# Patient Record
Sex: MALE | Race: BLACK OR AFRICAN AMERICAN
[De-identification: names, ages, dates, MRNs, and addresses within clinical notes are randomized per-mention and may not be internally consistent; named-entity substitution may affect disease eponyms.]

---

## 2018-07-16 ENCOUNTER — HOSPITAL ENCOUNTER (EMERGENCY)
Dept: HOSPITAL 91 - FTE | Age: 48
Discharge: HOME | End: 2018-07-16
Payer: COMMERCIAL

## 2018-07-16 ENCOUNTER — HOSPITAL ENCOUNTER (EMERGENCY)
Age: 48
Discharge: HOME | End: 2018-07-16

## 2018-07-16 DIAGNOSIS — R19.7: ICD-10-CM

## 2018-07-16 DIAGNOSIS — K80.20: Primary | ICD-10-CM

## 2018-07-16 DIAGNOSIS — J45.909: ICD-10-CM

## 2018-07-16 DIAGNOSIS — Z91.010: ICD-10-CM

## 2018-07-16 DIAGNOSIS — D64.9: ICD-10-CM

## 2018-07-16 DIAGNOSIS — D72.819: ICD-10-CM

## 2018-07-16 DIAGNOSIS — F17.210: ICD-10-CM

## 2018-07-16 LAB
ADD MAN DIFF?: NO
ALANINE AMINOTRANSFERASE: 44 IU/L (ref 13–69)
ALBUMIN/GLOBULIN RATIO: 0.54
ALBUMIN: 3.6 G/DL (ref 3.3–4.9)
ALKALINE PHOSPHATASE: 73 IU/L (ref 42–121)
ANION GAP: 12 (ref 8–16)
ASPARTATE AMINO TRANSFERASE: 51 IU/L (ref 15–46)
BASOPHIL #: 0 10^3/UL (ref 0–0.1)
BASOPHILS %: 0.2 % (ref 0–2)
BILIRUBIN,DIRECT: 0 MG/DL (ref 0–0.2)
BILIRUBIN,TOTAL: 0.4 MG/DL (ref 0.2–1.3)
BLOOD UREA NITROGEN: 15 MG/DL (ref 7–20)
CALCIUM: 9.1 MG/DL (ref 8.4–10.2)
CARBON DIOXIDE: 23 MMOL/L (ref 21–31)
CHLORIDE: 108 MMOL/L (ref 97–110)
CREATININE: 1.27 MG/DL (ref 0.61–1.24)
EOSINOPHILS #: 0 10^3/UL (ref 0–0.5)
EOSINOPHILS %: 0.8 % (ref 0–7)
GLOBULIN: 6.6 G/DL (ref 1.3–3.2)
GLUCOSE: 87 MG/DL (ref 70–220)
HEMATOCRIT: 36.3 % (ref 42–52)
HEMOGLOBIN: 12.1 G/DL (ref 14–18)
LIPASE: 96 U/L (ref 23–300)
LYMPHOCYTES #: 0.9 10^3/UL (ref 0.8–2.9)
LYMPHOCYTES %: 18.1 % (ref 15–51)
MEAN CORPUSCULAR HEMOGLOBIN: 30.3 PG (ref 29–33)
MEAN CORPUSCULAR HGB CONC: 33.3 G/DL (ref 32–37)
MEAN CORPUSCULAR VOLUME: 91 FL (ref 82–101)
MEAN PLATELET VOLUME: 9.2 FL (ref 7.4–10.4)
MONOCYTE #: 0.8 10^3/UL (ref 0.3–0.9)
MONOCYTES %: 16.9 % (ref 0–11)
NEUTROPHIL #: 3 10^3/UL (ref 1.6–7.5)
NEUTROPHILS %: 63.6 % (ref 39–77)
NUCLEATED RED BLOOD CELLS #: 0 10^3/UL (ref 0–0)
NUCLEATED RED BLOOD CELLS%: 0 /100WBC (ref 0–0)
PLATELET COUNT: 298 10^3/UL (ref 140–415)
POTASSIUM: 3.5 MMOL/L (ref 3.5–5.1)
RED BLOOD COUNT: 3.99 10^6/UL (ref 4.7–6.1)
RED CELL DISTRIBUTION WIDTH: 12.7 % (ref 11.5–14.5)
SODIUM: 139 MMOL/L (ref 135–144)
TOTAL PROTEIN: 10.2 G/DL (ref 6.1–8.1)
WHITE BLOOD COUNT: 4.7 10^3/UL (ref 4.8–10.8)

## 2018-07-16 PROCEDURE — 85025 COMPLETE CBC W/AUTO DIFF WBC: CPT

## 2018-07-16 PROCEDURE — 80053 COMPREHEN METABOLIC PANEL: CPT

## 2018-07-16 PROCEDURE — 74176 CT ABD & PELVIS W/O CONTRAST: CPT

## 2018-07-16 PROCEDURE — 99284 EMERGENCY DEPT VISIT MOD MDM: CPT

## 2018-07-16 PROCEDURE — 36415 COLL VENOUS BLD VENIPUNCTURE: CPT

## 2018-07-16 PROCEDURE — 83690 ASSAY OF LIPASE: CPT

## 2018-07-17 ENCOUNTER — HOSPITAL ENCOUNTER (EMERGENCY)
Dept: HOSPITAL 54 - ER | Age: 48
Discharge: HOME | End: 2018-07-17
Payer: COMMERCIAL

## 2018-07-17 VITALS
SYSTOLIC BLOOD PRESSURE: 136 MMHG | BODY MASS INDEX: 22.35 KG/M2 | DIASTOLIC BLOOD PRESSURE: 94 MMHG | HEIGHT: 72 IN | WEIGHT: 165 LBS

## 2018-07-17 DIAGNOSIS — F17.200: ICD-10-CM

## 2018-07-17 DIAGNOSIS — R10.9: ICD-10-CM

## 2018-07-17 DIAGNOSIS — F19.10: Primary | ICD-10-CM

## 2018-07-17 DIAGNOSIS — E11.9: ICD-10-CM

## 2018-07-17 DIAGNOSIS — E87.6: ICD-10-CM

## 2018-07-17 DIAGNOSIS — J45.909: ICD-10-CM

## 2018-07-17 LAB
ALBUMIN SERPL BCP-MCNC: 2.5 G/DL (ref 3.4–5)
ALP SERPL-CCNC: 55 U/L (ref 46–116)
ALT SERPL W P-5'-P-CCNC: 38 U/L (ref 12–78)
AST SERPL W P-5'-P-CCNC: 34 U/L (ref 15–37)
BASOPHILS # BLD AUTO: 0 /CMM (ref 0–0.2)
BASOPHILS NFR BLD AUTO: 0.4 % (ref 0–2)
BILIRUB DIRECT SERPL-MCNC: 0.1 MG/DL (ref 0–0.2)
BILIRUB SERPL-MCNC: 0.4 MG/DL (ref 0.2–1)
BILIRUB UR QL STRIP: (no result)
BUN SERPL-MCNC: 9 MG/DL (ref 7–18)
CALCIUM SERPL-MCNC: 8.5 MG/DL (ref 8.5–10.1)
CHLORIDE SERPL-SCNC: 102 MMOL/L (ref 98–107)
CO2 SERPL-SCNC: 23 MMOL/L (ref 21–32)
CREAT SERPL-MCNC: 1.1 MG/DL (ref 0.6–1.3)
EOSINOPHIL NFR BLD AUTO: 0.9 % (ref 0–6)
GLUCOSE SERPL-MCNC: 89 MG/DL (ref 74–106)
HCT VFR BLD AUTO: 39 % (ref 39–51)
HGB BLD-MCNC: 13 G/DL (ref 13.5–17.5)
KETONES UR STRIP-MCNC: 15 MG/DL
LIPASE SERPL-CCNC: 114 U/L (ref 73–393)
LYMPHOCYTES NFR BLD AUTO: 0.9 /CMM (ref 0.8–4.8)
LYMPHOCYTES NFR BLD AUTO: 18.1 % (ref 20–44)
MCH RBC QN AUTO: 30 PG (ref 26–33)
MCHC RBC AUTO-ENTMCNC: 34 G/DL (ref 31–36)
MCV RBC AUTO: 90 FL (ref 80–96)
MONOCYTES NFR BLD AUTO: 0.5 /CMM (ref 0.1–1.3)
MONOCYTES NFR BLD AUTO: 10.5 % (ref 2–12)
NEUTROPHILS # BLD AUTO: 3.4 /CMM (ref 1.8–8.9)
NEUTROPHILS NFR BLD AUTO: 70.1 % (ref 43–81)
PH UR STRIP: 7 [PH] (ref 5–8)
PLATELET # BLD AUTO: 298 /CMM (ref 150–450)
POTASSIUM SERPL-SCNC: 3.1 MMOL/L (ref 3.5–5.1)
PROT SERPL-MCNC: 11.2 G/DL (ref 6.4–8.2)
PROT UR QL STRIP: 100 MG/DL
RBC # BLD AUTO: 4.31 MIL/UL (ref 4.5–6)
RBC #/AREA URNS HPF: (no result) /HPF (ref 0–2)
RDW COEFFICIENT OF VARIATION: 12.7 (ref 11.5–15)
SODIUM SERPL-SCNC: 133 MMOL/L (ref 136–145)
TROPONIN I SERPL-MCNC: < 0.017 NG/ML (ref 0–0.06)
UROBILINOGEN UR STRIP-MCNC: 2 EU/DL
WBC #/AREA URNS HPF: (no result) /HPF (ref 0–3)
WBC NRBC COR # BLD AUTO: 4.8 K/UL (ref 4.3–11)

## 2018-07-17 PROCEDURE — A4606 OXYGEN PROBE USED W OXIMETER: HCPCS

## 2018-07-17 PROCEDURE — Z7610: HCPCS

## 2018-07-17 NOTE — NUR
-------------------------------------------------------------------------------

            *** Note jonathan in ED - 07/17/18 at 1917 by ADA ***            

-------------------------------------------------------------------------------

the patient came to ER today for treatment and evaluation of abdominal pain 
with nausea and vomiting

## 2018-07-17 NOTE — NUR
-------------------------------------------------------------------------------

            *** Note undone in [facility] - 07/17/18 at 1917 by ADA ***            

-------------------------------------------------------------------------------

awaiting fo rfMD to see

## 2018-07-17 NOTE — NUR
-------------------------------------------------------------------------------

            *** Note jonathan in ED - 07/17/18 at 1917 by ADA ***            

-------------------------------------------------------------------------------

the patient had colostomy - LLQ; appears viable; done July 2, 2018.  The 
patient's RLQ colostomy -closed and new one LLQ  created with bowel resection.  


The patient has history ulcerative colitis

## 2018-08-08 ENCOUNTER — HOSPITAL ENCOUNTER (EMERGENCY)
Age: 48
LOS: 1 days | Discharge: HOME | End: 2018-08-09

## 2018-08-08 ENCOUNTER — HOSPITAL ENCOUNTER (EMERGENCY)
Dept: HOSPITAL 91 - E/R | Age: 48
LOS: 1 days | Discharge: HOME | End: 2018-08-09
Payer: COMMERCIAL

## 2018-08-08 DIAGNOSIS — S13.9XXA: ICD-10-CM

## 2018-08-08 DIAGNOSIS — S00.01XA: ICD-10-CM

## 2018-08-08 DIAGNOSIS — J45.909: ICD-10-CM

## 2018-08-08 DIAGNOSIS — S09.90XA: ICD-10-CM

## 2018-08-08 DIAGNOSIS — S80.02XA: ICD-10-CM

## 2018-08-08 DIAGNOSIS — S60.211A: ICD-10-CM
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
## 2018-08-08 DIAGNOSIS — F17.210: ICD-10-CM

## 2018-08-08 DIAGNOSIS — Z91.010: ICD-10-CM

## 2018-08-08 DIAGNOSIS — S50.02XA: Primary | ICD-10-CM

## 2018-08-08 PROCEDURE — 72170 X-RAY EXAM OF PELVIS: CPT

## 2018-08-08 PROCEDURE — 71045 X-RAY EXAM CHEST 1 VIEW: CPT

## 2018-08-08 PROCEDURE — 72125 CT NECK SPINE W/O DYE: CPT

## 2018-08-08 PROCEDURE — 73080 X-RAY EXAM OF ELBOW: CPT

## 2018-08-08 PROCEDURE — 73110 X-RAY EXAM OF WRIST: CPT

## 2018-08-08 PROCEDURE — 99285 EMERGENCY DEPT VISIT HI MDM: CPT

## 2018-08-08 PROCEDURE — 73562 X-RAY EXAM OF KNEE 3: CPT

## 2018-08-08 PROCEDURE — 70450 CT HEAD/BRAIN W/O DYE: CPT

## 2018-08-08 RX ADMIN — ONDANSETRON 1 MG: 4 TABLET, ORALLY DISINTEGRATING ORAL at 22:23

## 2018-08-08 RX ADMIN — HYDROCODONE BITARTRATE AND ACETAMINOPHEN 1 TAB: 5; 325 TABLET ORAL at 22:23

## 2018-09-28 ENCOUNTER — HOSPITAL ENCOUNTER (EMERGENCY)
Age: 48
Discharge: HOME | End: 2018-09-28

## 2018-09-28 ENCOUNTER — HOSPITAL ENCOUNTER (EMERGENCY)
Dept: HOSPITAL 91 - E/R | Age: 48
Discharge: HOME | End: 2018-09-28
Payer: COMMERCIAL

## 2018-09-28 DIAGNOSIS — F15.10: Primary | ICD-10-CM

## 2018-09-28 DIAGNOSIS — J45.909: ICD-10-CM

## 2018-09-28 DIAGNOSIS — Z87.891: ICD-10-CM

## 2018-09-28 PROCEDURE — 99282 EMERGENCY DEPT VISIT SF MDM: CPT

## 2018-09-29 ENCOUNTER — HOSPITAL ENCOUNTER (EMERGENCY)
Dept: HOSPITAL 91 - E/R | Age: 48
Discharge: HOME | End: 2018-09-29
Payer: COMMERCIAL

## 2018-09-29 ENCOUNTER — HOSPITAL ENCOUNTER (EMERGENCY)
Age: 48
Discharge: HOME | End: 2018-09-29

## 2018-09-29 DIAGNOSIS — Z87.891: ICD-10-CM

## 2018-09-29 DIAGNOSIS — Z91.010: ICD-10-CM

## 2018-09-29 DIAGNOSIS — F99: Primary | ICD-10-CM

## 2018-09-29 DIAGNOSIS — J45.909: ICD-10-CM

## 2018-09-29 PROCEDURE — 99282 EMERGENCY DEPT VISIT SF MDM: CPT

## 2018-10-31 ENCOUNTER — HOSPITAL ENCOUNTER (EMERGENCY)
Dept: HOSPITAL 87 - ER | Age: 48
Discharge: HOME | End: 2018-10-31
Payer: COMMERCIAL

## 2018-10-31 VITALS — DIASTOLIC BLOOD PRESSURE: 75 MMHG | SYSTOLIC BLOOD PRESSURE: 115 MMHG

## 2018-10-31 VITALS — HEIGHT: 73 IN | WEIGHT: 169.76 LBS | BODY MASS INDEX: 22.5 KG/M2

## 2018-10-31 DIAGNOSIS — G62.9: ICD-10-CM

## 2018-10-31 DIAGNOSIS — J45.909: ICD-10-CM

## 2018-10-31 DIAGNOSIS — R07.89: Primary | ICD-10-CM

## 2018-10-31 DIAGNOSIS — Z98.890: ICD-10-CM

## 2018-10-31 DIAGNOSIS — R06.00: ICD-10-CM

## 2018-10-31 DIAGNOSIS — F17.200: ICD-10-CM

## 2018-10-31 DIAGNOSIS — I10: ICD-10-CM

## 2018-10-31 LAB
BASOPHILS NFR BLD AUTO: 0.3 % (ref 0–2)
CHLORIDE SERPL-SCNC: 109 MEQ/L (ref 98–107)
EOSINOPHIL NFR BLD AUTO: 2.4 % (ref 0–5)
ERYTHROCYTE [DISTWIDTH] IN BLOOD BY AUTOMATED COUNT: 14.7 % (ref 11.6–14.6)
HCT VFR BLD AUTO: 32 % (ref 42–52)
HGB BLD-MCNC: 11 G/DL (ref 14–18)
LYMPHOCYTES NFR BLD AUTO: 20 % (ref 20–50)
MCH RBC QN AUTO: 31 PG (ref 28–32)
MCV RBC AUTO: 90.7 FL (ref 80–94)
MONOCYTES NFR BLD AUTO: 13.5 % (ref 2–8)
NEUTROPHILS NFR BLD AUTO: 63.8 % (ref 40–76)
PLATELET # BLD AUTO: 275 X1000/UL (ref 130–400)
PMV BLD AUTO: 7.6 FL (ref 7.4–10.4)
RBC # BLD AUTO: 3.53 MILL/UL (ref 4.7–6.1)

## 2018-10-31 PROCEDURE — 84484 ASSAY OF TROPONIN QUANT: CPT

## 2018-10-31 PROCEDURE — 85025 COMPLETE CBC W/AUTO DIFF WBC: CPT

## 2018-10-31 PROCEDURE — 83690 ASSAY OF LIPASE: CPT

## 2018-10-31 PROCEDURE — 93005 ELECTROCARDIOGRAM TRACING: CPT

## 2018-10-31 PROCEDURE — 99285 EMERGENCY DEPT VISIT HI MDM: CPT

## 2018-10-31 PROCEDURE — 83880 ASSAY OF NATRIURETIC PEPTIDE: CPT

## 2018-10-31 PROCEDURE — 36415 COLL VENOUS BLD VENIPUNCTURE: CPT

## 2018-10-31 PROCEDURE — 80053 COMPREHEN METABOLIC PANEL: CPT

## 2018-10-31 PROCEDURE — 71045 X-RAY EXAM CHEST 1 VIEW: CPT

## 2018-11-02 ENCOUNTER — HOSPITAL ENCOUNTER (EMERGENCY)
Dept: HOSPITAL 91 - FTE | Age: 48
Discharge: HOME | End: 2018-11-02
Payer: COMMERCIAL

## 2018-11-02 ENCOUNTER — HOSPITAL ENCOUNTER (EMERGENCY)
Age: 48
Discharge: HOME | End: 2018-11-02

## 2018-11-02 DIAGNOSIS — J45.909: ICD-10-CM

## 2018-11-02 DIAGNOSIS — F17.210: ICD-10-CM

## 2018-11-02 DIAGNOSIS — R21: Primary | ICD-10-CM

## 2018-11-02 DIAGNOSIS — Z21: ICD-10-CM

## 2018-11-02 PROCEDURE — 99282 EMERGENCY DEPT VISIT SF MDM: CPT

## 2021-12-27 ENCOUNTER — HOSPITAL ENCOUNTER (EMERGENCY)
Dept: HOSPITAL 87 - ER | Age: 51
Discharge: LEFT BEFORE DISCHARGE | End: 2021-12-27
Payer: MEDICAID

## 2021-12-27 VITALS — WEIGHT: 187.39 LBS | BODY MASS INDEX: 24.84 KG/M2 | HEIGHT: 73 IN

## 2021-12-27 VITALS — SYSTOLIC BLOOD PRESSURE: 171 MMHG | DIASTOLIC BLOOD PRESSURE: 110 MMHG

## 2021-12-27 DIAGNOSIS — R07.89: Primary | ICD-10-CM

## 2021-12-27 LAB
AMPHETAMINES UR QL SCN: (no result)
APPEARANCE UR: CLEAR
BARBITURATES UR QL SCN: NEGATIVE
BENZODIAZ UR QL SCN: NEGATIVE
BZE UR QL SCN: NEGATIVE
CANNABINOIDS UR QL SCN: (no result)
COLOR UR: YELLOW
HGB UR QL STRIP: NEGATIVE
KETONES UR STRIP-MCNC: (no result) MG/DL
LEUKOCYTE ESTERASE UR QL STRIP: NEGATIVE
METHADONE UR QL SCN: NEGATIVE
NITRITE UR QL STRIP: NEGATIVE
OPIATES UR QL SCN: NEGATIVE
PCP UR QL SCN: NEGATIVE
PH UR STRIP: 5.5 [PH] (ref 4.5–8)
PROT UR QL STRIP: (no result)
SP GR UR STRIP: 1.03 (ref 1–1.03)
UROBILINOGEN UR STRIP-MCNC: 1 E.U./DL (ref 0.2–1)

## 2021-12-27 PROCEDURE — 81003 URINALYSIS AUTO W/O SCOPE: CPT

## 2021-12-27 PROCEDURE — 80305 DRUG TEST PRSMV DIR OPT OBS: CPT

## 2022-09-03 ENCOUNTER — HOSPITAL ENCOUNTER (EMERGENCY)
Dept: HOSPITAL 87 - ER | Age: 52
LOS: 1 days | Discharge: LEFT BEFORE DISCHARGE | End: 2022-09-04
Payer: MEDICAID

## 2022-09-03 VITALS — DIASTOLIC BLOOD PRESSURE: 100 MMHG | SYSTOLIC BLOOD PRESSURE: 147 MMHG

## 2022-09-03 VITALS — HEIGHT: 70 IN | WEIGHT: 160.94 LBS | BODY MASS INDEX: 23.04 KG/M2

## 2022-09-03 DIAGNOSIS — Z53.21: Primary | ICD-10-CM

## 2022-11-30 ENCOUNTER — HOSPITAL ENCOUNTER (EMERGENCY)
Dept: HOSPITAL 87 - ER | Age: 52
Discharge: HOME | End: 2022-11-30
Payer: MEDICAID

## 2022-11-30 VITALS — HEIGHT: 74 IN | BODY MASS INDEX: 22.63 KG/M2 | WEIGHT: 176.37 LBS

## 2022-11-30 VITALS — SYSTOLIC BLOOD PRESSURE: 114 MMHG | DIASTOLIC BLOOD PRESSURE: 57 MMHG

## 2022-11-30 DIAGNOSIS — Z21: ICD-10-CM

## 2022-11-30 DIAGNOSIS — F15.10: ICD-10-CM

## 2022-11-30 DIAGNOSIS — F23: Primary | ICD-10-CM

## 2022-11-30 DIAGNOSIS — Z20.822: ICD-10-CM

## 2022-11-30 DIAGNOSIS — F12.10: ICD-10-CM

## 2022-11-30 DIAGNOSIS — Z88.3: ICD-10-CM

## 2022-11-30 DIAGNOSIS — F16.10: ICD-10-CM

## 2022-11-30 DIAGNOSIS — F14.10: ICD-10-CM

## 2022-11-30 DIAGNOSIS — Z88.2: ICD-10-CM

## 2022-11-30 LAB
AMPHETAMINES UR QL SCN: (no result)
APPEARANCE UR: (no result)
BARBITURATES UR QL SCN: NEGATIVE
BENZODIAZ UR QL SCN: NEGATIVE
BZE UR QL SCN: NEGATIVE
CANNABINOIDS UR QL SCN: (no result)
COLOR UR: YELLOW
HGB UR QL STRIP: (no result)
KETONES UR STRIP-MCNC: (no result) MG/DL
LEUKOCYTE ESTERASE UR QL STRIP: NEGATIVE
METHADONE UR QL SCN: NEGATIVE
NITRITE UR QL STRIP: NEGATIVE
OPIATES UR QL SCN: NEGATIVE
PCP UR QL SCN: NEGATIVE
PH UR STRIP: 5.5 [PH] (ref 4.5–8)
PROT UR QL STRIP: (no result)
SP GR UR STRIP: 1.02 (ref 1–1.03)
UROBILINOGEN UR STRIP-MCNC: 1 E.U./DL (ref 0.2–1)

## 2022-11-30 PROCEDURE — 96372 THER/PROPH/DIAG INJ SC/IM: CPT

## 2022-11-30 PROCEDURE — 81003 URINALYSIS AUTO W/O SCOPE: CPT

## 2022-11-30 PROCEDURE — 80305 DRUG TEST PRSMV DIR OPT OBS: CPT

## 2022-11-30 PROCEDURE — 99283 EMERGENCY DEPT VISIT LOW MDM: CPT

## 2023-11-29 ENCOUNTER — HOSPITAL ENCOUNTER (EMERGENCY)
Dept: HOSPITAL 87 - ER | Age: 53
LOS: 1 days | Discharge: HOME | End: 2023-11-30
Payer: MEDICAID

## 2023-11-29 VITALS — HEART RATE: 87 BPM | OXYGEN SATURATION: 99 % | TEMPERATURE: 98.9 F

## 2023-11-29 VITALS — BODY MASS INDEX: 21.33 KG/M2 | WEIGHT: 160.94 LBS | HEIGHT: 73 IN

## 2023-11-29 DIAGNOSIS — Z88.2: ICD-10-CM

## 2023-11-29 DIAGNOSIS — S20.212A: Primary | ICD-10-CM
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
## 2023-11-29 DIAGNOSIS — Z88.8: ICD-10-CM

## 2023-11-29 DIAGNOSIS — F12.90: ICD-10-CM

## 2023-11-29 DIAGNOSIS — F31.9: ICD-10-CM
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
## 2023-11-29 DIAGNOSIS — F14.90: ICD-10-CM
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
## 2023-11-29 PROCEDURE — 99283 EMERGENCY DEPT VISIT LOW MDM: CPT

## 2023-11-29 PROCEDURE — 71101 X-RAY EXAM UNILAT RIBS/CHEST: CPT

## 2023-11-30 VITALS — RESPIRATION RATE: 19 BRPM | DIASTOLIC BLOOD PRESSURE: 68 MMHG | SYSTOLIC BLOOD PRESSURE: 131 MMHG

## 2024-06-17 ENCOUNTER — HOSPITAL ENCOUNTER (EMERGENCY)
Dept: HOSPITAL 87 - ER | Age: 54
End: 2024-06-17
Payer: COMMERCIAL

## 2024-06-17 VITALS — HEIGHT: 70 IN | WEIGHT: 132.28 LBS | BODY MASS INDEX: 18.94 KG/M2

## 2024-06-17 VITALS — OXYGEN SATURATION: 100 %

## 2024-06-17 DIAGNOSIS — R10.13: Primary | ICD-10-CM

## 2024-06-17 DIAGNOSIS — J45.909: ICD-10-CM

## 2024-06-17 DIAGNOSIS — Z53.29: ICD-10-CM

## 2024-06-17 DIAGNOSIS — F31.9: ICD-10-CM

## 2024-06-17 LAB
BASOPHILS NFR BLD AUTO: 0.2 % (ref 0–2)
BUN SERPL-MCNC: 13 MG/DL (ref 9–23)
CALCIUM SERPL-MCNC: 9.7 MG/DL (ref 8.7–10.4)
CHLORIDE SERPL-SCNC: 109 MEQ/L (ref 98–107)
CO2 SERPL-SCNC: 24 MEQ/L (ref 21–32)
CREAT SERPL-MCNC: 1.1 MG/DL (ref 0.6–1.3)
EOSINOPHIL NFR BLD AUTO: 0.4 % (ref 0–5)
ERYTHROCYTE [DISTWIDTH] IN BLOOD BY AUTOMATED COUNT: 14.3 % (ref 11.6–14.6)
GLUCOSE SERPL-MCNC: 83 MG/DL (ref 70–105)
HCT VFR BLD AUTO: 42.1 % (ref 42–52)
HGB BLD-MCNC: 13.9 G/DL (ref 14–18)
INR PPP: 1
LYMPHOCYTES NFR BLD AUTO: 7.9 % (ref 20–50)
MCH RBC QN AUTO: 30.8 PG (ref 28–32)
MCHC RBC AUTO-ENTMCNC: 32.9 G/DL (ref 31–37)
MCV RBC AUTO: 93.4 FL (ref 80–94)
MONOCYTES NFR BLD AUTO: 8.5 % (ref 2–8)
NEUTROPHILS NFR BLD AUTO: 83 % (ref 40–76)
PLATELET # BLD AUTO: 309 X1000/UL (ref 130–400)
PMV BLD AUTO: 7 FL (ref 7.4–10.4)
POTASSIUM SERPL-SCNC: 4.1 MEQ/L (ref 3.5–5.1)
PROTHROMBIN TIME: 11.1 SEC (ref 9.6–11)
RBC # BLD AUTO: 4.51 MILL/UL (ref 4.7–6.1)
SODIUM SERPL-SCNC: 134 MEQ/L (ref 136–145)
WBC # BLD: 8.6 X1000/UL (ref 4.5–11)

## 2024-06-17 PROCEDURE — 85610 PROTHROMBIN TIME: CPT

## 2024-06-17 PROCEDURE — 80048 BASIC METABOLIC PNL TOTAL CA: CPT

## 2024-06-17 PROCEDURE — 36415 COLL VENOUS BLD VENIPUNCTURE: CPT

## 2024-06-17 PROCEDURE — 96374 THER/PROPH/DIAG INJ IV PUSH: CPT

## 2024-06-17 PROCEDURE — 85025 COMPLETE CBC W/AUTO DIFF WBC: CPT

## 2024-06-17 PROCEDURE — 99285 EMERGENCY DEPT VISIT HI MDM: CPT

## 2024-06-17 PROCEDURE — 96361 HYDRATE IV INFUSION ADD-ON: CPT

## 2024-06-17 PROCEDURE — 83690 ASSAY OF LIPASE: CPT

## 2024-06-17 RX ADMIN — DEXTROSE ONE MLS/HR: 5 SOLUTION INTRAVENOUS at 16:55

## 2024-06-17 RX ADMIN — Medication STA MG: at 09:43

## 2024-06-17 RX ADMIN — KETOROLAC TROMETHAMINE NR MG: 30 INJECTION, SOLUTION INTRAMUSCULAR at 17:59

## 2024-06-17 RX ADMIN — DEXTROSE ONE MLS/HR: 5 SOLUTION INTRAVENOUS at 13:50

## 2024-06-18 VITALS
RESPIRATION RATE: 20 BRPM | SYSTOLIC BLOOD PRESSURE: 136 MMHG | TEMPERATURE: 98.3 F | DIASTOLIC BLOOD PRESSURE: 82 MMHG | HEART RATE: 88 BPM

## 2024-10-29 ENCOUNTER — HOSPITAL ENCOUNTER (EMERGENCY)
Dept: HOSPITAL 87 - ER | Age: 54
Discharge: HOME | End: 2024-10-29
Payer: MEDICAID

## 2024-10-29 VITALS
HEART RATE: 97 BPM | OXYGEN SATURATION: 98 % | DIASTOLIC BLOOD PRESSURE: 94 MMHG | RESPIRATION RATE: 16 BRPM | TEMPERATURE: 98.8 F | SYSTOLIC BLOOD PRESSURE: 132 MMHG

## 2024-10-29 VITALS — BODY MASS INDEX: 20.99 KG/M2 | WEIGHT: 149.91 LBS | HEIGHT: 71 IN

## 2024-10-29 DIAGNOSIS — Z88.1: ICD-10-CM

## 2024-10-29 DIAGNOSIS — Z88.2: ICD-10-CM

## 2024-10-29 DIAGNOSIS — F31.9: ICD-10-CM

## 2024-10-29 DIAGNOSIS — J45.909: ICD-10-CM

## 2024-10-29 DIAGNOSIS — F15.90: Primary | ICD-10-CM

## 2024-10-29 DIAGNOSIS — Z79.899: ICD-10-CM

## 2024-10-29 PROCEDURE — 99283 EMERGENCY DEPT VISIT LOW MDM: CPT

## 2024-10-29 PROCEDURE — 82962 GLUCOSE BLOOD TEST: CPT

## 2024-11-02 ENCOUNTER — HOSPITAL ENCOUNTER (EMERGENCY)
Dept: HOSPITAL 87 - ER | Age: 54
Discharge: HOME | End: 2024-11-02
Payer: MEDICAID

## 2024-11-02 VITALS
DIASTOLIC BLOOD PRESSURE: 75 MMHG | SYSTOLIC BLOOD PRESSURE: 113 MMHG | TEMPERATURE: 97.7 F | HEART RATE: 77 BPM | OXYGEN SATURATION: 100 %

## 2024-11-02 VITALS — HEIGHT: 68 IN | BODY MASS INDEX: 27.4 KG/M2 | WEIGHT: 180.78 LBS

## 2024-11-02 VITALS — RESPIRATION RATE: 16 BRPM

## 2024-11-02 DIAGNOSIS — F31.9: ICD-10-CM

## 2024-11-02 DIAGNOSIS — Z88.1: ICD-10-CM

## 2024-11-02 DIAGNOSIS — F14.90: ICD-10-CM

## 2024-11-02 DIAGNOSIS — F12.90: ICD-10-CM

## 2024-11-02 DIAGNOSIS — Z79.899: ICD-10-CM

## 2024-11-02 DIAGNOSIS — S09.90XA: Primary | ICD-10-CM

## 2024-11-02 DIAGNOSIS — J45.909: ICD-10-CM

## 2024-11-02 DIAGNOSIS — Z88.2: ICD-10-CM

## 2024-11-02 LAB
BASOPHILS NFR BLD AUTO: 0.4 % (ref 0–2)
BUN SERPL-MCNC: 18 MG/DL (ref 9–23)
CALCIUM SERPL-MCNC: 9 MG/DL (ref 8.7–10.4)
CARDIAC TROPONIN I PNL SERPL HS: < 4 NG/L (ref 3–53)
CHLORIDE SERPL-SCNC: 106 MEQ/L (ref 98–107)
CO2 SERPL-SCNC: 28 MEQ/L (ref 21–32)
CREAT SERPL-MCNC: 1.3 MG/DL (ref 0.6–1.3)
EOSINOPHIL NFR BLD AUTO: 0.2 % (ref 0–5)
ERYTHROCYTE [DISTWIDTH] IN BLOOD BY AUTOMATED COUNT: 14 % (ref 11.6–14.6)
GLUCOSE SERPL-MCNC: 83 MG/DL (ref 70–105)
HCT VFR BLD AUTO: 35.7 % (ref 42–52)
HGB BLD-MCNC: 11.5 G/DL (ref 14–18)
LYMPHOCYTES NFR BLD AUTO: 7.7 % (ref 20–50)
MCH RBC QN AUTO: 29.8 PG (ref 28–32)
MCHC RBC AUTO-ENTMCNC: 32.4 G/DL (ref 31–37)
MCV RBC AUTO: 91.9 FL (ref 80–94)
MONOCYTES NFR BLD AUTO: 9.9 % (ref 2–8)
NEUTROPHILS NFR BLD AUTO: 81.8 % (ref 40–76)
PLATELET # BLD AUTO: 373 X1000/UL (ref 130–400)
PMV BLD AUTO: 7.3 FL (ref 7.4–10.4)
POTASSIUM SERPL-SCNC: 4.6 MEQ/L (ref 3.5–5.1)
RBC # BLD AUTO: 3.88 MILL/UL (ref 4.7–6.1)
SODIUM SERPL-SCNC: 136 MEQ/L (ref 136–145)
WBC # BLD: 5.6 X1000/UL (ref 4.5–11)

## 2024-11-02 PROCEDURE — 93005 ELECTROCARDIOGRAM TRACING: CPT

## 2024-11-02 PROCEDURE — 84484 ASSAY OF TROPONIN QUANT: CPT

## 2024-11-02 PROCEDURE — 83880 ASSAY OF NATRIURETIC PEPTIDE: CPT

## 2024-11-02 PROCEDURE — 71045 X-RAY EXAM CHEST 1 VIEW: CPT

## 2024-11-02 PROCEDURE — 80048 BASIC METABOLIC PNL TOTAL CA: CPT

## 2024-11-02 PROCEDURE — 36415 COLL VENOUS BLD VENIPUNCTURE: CPT

## 2024-11-02 PROCEDURE — 99285 EMERGENCY DEPT VISIT HI MDM: CPT

## 2024-11-02 PROCEDURE — 85025 COMPLETE CBC W/AUTO DIFF WBC: CPT

## 2025-01-12 ENCOUNTER — HOSPITAL ENCOUNTER (EMERGENCY)
Dept: HOSPITAL 87 - ER | Age: 55
Discharge: HOME | End: 2025-01-12
Payer: MEDICAID

## 2025-01-12 VITALS — SYSTOLIC BLOOD PRESSURE: 107 MMHG | RESPIRATION RATE: 20 BRPM | DIASTOLIC BLOOD PRESSURE: 74 MMHG | HEART RATE: 78 BPM

## 2025-01-12 VITALS — HEART RATE: 84 BPM | OXYGEN SATURATION: 96 % | RESPIRATION RATE: 20 BRPM

## 2025-01-12 VITALS — TEMPERATURE: 98.11 F | OXYGEN SATURATION: 98 %

## 2025-01-12 VITALS — BODY MASS INDEX: 26.3 KG/M2 | WEIGHT: 198.42 LBS | HEIGHT: 73 IN

## 2025-01-12 DIAGNOSIS — Z79.899: ICD-10-CM

## 2025-01-12 DIAGNOSIS — Z88.1: ICD-10-CM

## 2025-01-12 DIAGNOSIS — F14.10: ICD-10-CM

## 2025-01-12 DIAGNOSIS — F12.10: ICD-10-CM

## 2025-01-12 DIAGNOSIS — Z79.624: ICD-10-CM

## 2025-01-12 DIAGNOSIS — Z88.2: ICD-10-CM

## 2025-01-12 DIAGNOSIS — J45.901: Primary | ICD-10-CM

## 2025-01-12 LAB
ALBUMIN SERPL BCP-MCNC: 3 G/DL (ref 3.2–4.8)
ALT SERPL W P-5'-P-CCNC: 25 IU/L (ref 10–49)
AST SERPL W P-5'-P-CCNC: 28 IU/L (ref ?–34)
BILIRUB SERPL-MCNC: 0.3 MG/DL (ref 0.1–1)
BUN SERPL-MCNC: 24 MG/DL (ref 9–23)
CALCIUM SERPL-MCNC: 8.9 MG/DL (ref 8.7–10.4)
CHLORIDE SERPL-SCNC: 102 MEQ/L (ref 98–107)
CO2 SERPL-SCNC: 24 MEQ/L (ref 21–32)
CREAT SERPL-MCNC: 1.3 MG/DL (ref 0.6–1.3)
ERYTHROCYTE [DISTWIDTH] IN BLOOD BY AUTOMATED COUNT: 14.7 % (ref 11.6–14.6)
GLUCOSE SERPL-MCNC: 96 MG/DL (ref 70–105)
HCT VFR BLD AUTO: 33.6 % (ref 42–52)
HGB BLD-MCNC: 11 G/DL (ref 14–18)
LYMPHOCYTES NFR BLD MANUAL: 5 % (ref 20–50)
MANUAL DIF COMMENT BLD-IMP: 1
MCH RBC QN AUTO: 30.4 PG (ref 28–32)
MCHC RBC AUTO-ENTMCNC: 32.8 G/DL (ref 31–37)
MCV RBC AUTO: 92.7 FL (ref 80–94)
MONOCYTES NFR BLD MANUAL: 9 % (ref 2–8)
NEUTS SEG NFR BLD MANUAL: 86 % (ref 45–75)
PLATELET # BLD AUTO: 404 X1000/UL (ref 130–400)
PLATELET # BLD EST: NORMAL 10*3/UL
PMV BLD AUTO: 7.3 FL (ref 7.4–10.4)
POTASSIUM SERPL-SCNC: 4.2 MEQ/L (ref 3.5–5.1)
PROT SERPL-MCNC: 10.2 G/DL (ref 6–8.3)
RBC # BLD AUTO: 3.63 MILL/UL (ref 4.7–6.1)
RBC MORPH BLD: NORMAL
SODIUM SERPL-SCNC: 131 MEQ/L (ref 136–145)
WBC # BLD: 10.9 X1000/UL (ref 4.5–11)

## 2025-01-12 PROCEDURE — 94640 AIRWAY INHALATION TREATMENT: CPT

## 2025-01-12 PROCEDURE — 85025 COMPLETE CBC W/AUTO DIFF WBC: CPT

## 2025-01-12 PROCEDURE — 99283 EMERGENCY DEPT VISIT LOW MDM: CPT

## 2025-01-12 PROCEDURE — 83690 ASSAY OF LIPASE: CPT

## 2025-01-12 PROCEDURE — 36415 COLL VENOUS BLD VENIPUNCTURE: CPT

## 2025-01-12 PROCEDURE — 80053 COMPREHEN METABOLIC PANEL: CPT

## 2025-01-12 RX ADMIN — DEXAMETHASONE SODIUM PHOSPHATE NR MG: 10 INJECTION INTRAMUSCULAR; INTRAVENOUS at 05:29

## 2025-01-12 RX ADMIN — IPRATROPIUM BROMIDE AND ALBUTEROL SULFATE NR ML: .5; 3 SOLUTION RESPIRATORY (INHALATION) at 05:01

## 2025-01-12 RX ADMIN — IBUPROFEN NR MG: 600 TABLET ORAL at 05:29

## 2025-01-12 RX ADMIN — DEXAMETHASONE SODIUM PHOSPHATE ONE MG: 10 INJECTION INTRAMUSCULAR; INTRAVENOUS at 01:45

## 2025-01-12 RX ADMIN — IPRATROPIUM BROMIDE AND ALBUTEROL SULFATE ONE ML: .5; 3 SOLUTION RESPIRATORY (INHALATION) at 03:23

## 2025-01-12 RX ADMIN — IBUPROFEN ONE MG: 600 TABLET ORAL at 01:00

## 2025-02-03 ENCOUNTER — HOSPITAL ENCOUNTER (EMERGENCY)
Dept: HOSPITAL 87 - ER | Age: 55
Discharge: HOME | End: 2025-02-03
Payer: MEDICAID

## 2025-02-03 VITALS — HEIGHT: 73 IN | BODY MASS INDEX: 23.96 KG/M2 | WEIGHT: 180.78 LBS

## 2025-02-03 VITALS
DIASTOLIC BLOOD PRESSURE: 86 MMHG | HEART RATE: 99 BPM | OXYGEN SATURATION: 97 % | SYSTOLIC BLOOD PRESSURE: 123 MMHG | RESPIRATION RATE: 16 BRPM

## 2025-02-03 DIAGNOSIS — Z79.899: ICD-10-CM

## 2025-02-03 DIAGNOSIS — M54.2: Primary | ICD-10-CM

## 2025-02-03 DIAGNOSIS — Z88.2: ICD-10-CM

## 2025-02-03 DIAGNOSIS — J45.909: ICD-10-CM

## 2025-02-03 DIAGNOSIS — Z88.1: ICD-10-CM

## 2025-02-03 PROCEDURE — 72040 X-RAY EXAM NECK SPINE 2-3 VW: CPT

## 2025-02-03 PROCEDURE — 99283 EMERGENCY DEPT VISIT LOW MDM: CPT

## 2025-03-23 ENCOUNTER — HOSPITAL ENCOUNTER (EMERGENCY)
Dept: HOSPITAL 87 - ER | Age: 55
Discharge: HOME | End: 2025-03-23
Payer: MEDICAID

## 2025-03-23 VITALS
RESPIRATION RATE: 18 BRPM | OXYGEN SATURATION: 98 % | HEART RATE: 85 BPM | DIASTOLIC BLOOD PRESSURE: 73 MMHG | SYSTOLIC BLOOD PRESSURE: 114 MMHG

## 2025-03-23 VITALS — BODY MASS INDEX: 26.83 KG/M2 | WEIGHT: 187.39 LBS | HEIGHT: 70 IN

## 2025-03-23 VITALS — OXYGEN SATURATION: 100 % | TEMPERATURE: 98.5 F

## 2025-03-23 DIAGNOSIS — Z88.2: ICD-10-CM

## 2025-03-23 DIAGNOSIS — Z88.1: ICD-10-CM

## 2025-03-23 DIAGNOSIS — R06.02: ICD-10-CM

## 2025-03-23 DIAGNOSIS — Z79.624: ICD-10-CM

## 2025-03-23 DIAGNOSIS — F15.90: ICD-10-CM

## 2025-03-23 DIAGNOSIS — D64.9: Primary | ICD-10-CM

## 2025-03-23 DIAGNOSIS — F12.90: ICD-10-CM

## 2025-03-23 DIAGNOSIS — M79.673: ICD-10-CM

## 2025-03-23 DIAGNOSIS — J45.909: ICD-10-CM

## 2025-03-23 DIAGNOSIS — Z79.899: ICD-10-CM

## 2025-03-23 LAB
BASOPHILS NFR BLD AUTO: 0.8 % (ref 0–2)
BUN SERPL-MCNC: 13 MG/DL (ref 9–23)
CALCIUM SERPL-MCNC: 8.2 MG/DL (ref 8.7–10.4)
CARDIAC TROPONIN I PNL SERPL HS: < 4 NG/L (ref 3–53)
CHLORIDE SERPL-SCNC: 104 MEQ/L (ref 98–107)
CO2 SERPL-SCNC: 24 MEQ/L (ref 21–32)
CREAT SERPL-MCNC: 1 MG/DL (ref 0.6–1.3)
EOSINOPHIL NFR BLD AUTO: 0.2 % (ref 0–5)
ERYTHROCYTE [DISTWIDTH] IN BLOOD BY AUTOMATED COUNT: 15.1 % (ref 11.6–14.6)
GLUCOSE SERPL-MCNC: 112 MG/DL (ref 70–105)
HCT VFR BLD AUTO: 27.9 % (ref 42–52)
HGB BLD-MCNC: 9.3 G/DL (ref 14–18)
LYMPHOCYTES NFR BLD AUTO: 10.1 % (ref 20–50)
MCH RBC QN AUTO: 29.9 PG (ref 28–32)
MCHC RBC AUTO-ENTMCNC: 33.3 G/DL (ref 31–37)
MCV RBC AUTO: 89.8 FL (ref 80–94)
MONOCYTES NFR BLD AUTO: 12.9 % (ref 2–8)
NEUTROPHILS NFR BLD AUTO: 76 % (ref 40–76)
PLATELET # BLD AUTO: 443 X1000/UL (ref 130–400)
PMV BLD AUTO: 7.3 FL (ref 7.4–10.4)
POTASSIUM SERPL-SCNC: 3.6 MEQ/L (ref 3.5–5.1)
RBC # BLD AUTO: 3.11 MILL/UL (ref 4.7–6.1)
SODIUM SERPL-SCNC: 133 MEQ/L (ref 136–145)
WBC # BLD: 8 X1000/UL (ref 4.5–11)

## 2025-03-23 PROCEDURE — 80048 BASIC METABOLIC PNL TOTAL CA: CPT

## 2025-03-23 PROCEDURE — 85025 COMPLETE CBC W/AUTO DIFF WBC: CPT

## 2025-03-23 PROCEDURE — 84484 ASSAY OF TROPONIN QUANT: CPT

## 2025-03-23 PROCEDURE — 36415 COLL VENOUS BLD VENIPUNCTURE: CPT

## 2025-03-23 PROCEDURE — 99285 EMERGENCY DEPT VISIT HI MDM: CPT

## 2025-03-23 PROCEDURE — 71045 X-RAY EXAM CHEST 1 VIEW: CPT

## 2025-03-23 PROCEDURE — 83880 ASSAY OF NATRIURETIC PEPTIDE: CPT

## 2025-03-23 PROCEDURE — 93005 ELECTROCARDIOGRAM TRACING: CPT

## 2025-03-23 RX ADMIN — DEXTROSE ONE MLS/HR: 5 SOLUTION INTRAVENOUS at 21:02

## 2025-03-23 RX ADMIN — IBUPROFEN ONE MG: 600 TABLET ORAL at 21:02
